# Patient Record
Sex: FEMALE | URBAN - METROPOLITAN AREA
[De-identification: names, ages, dates, MRNs, and addresses within clinical notes are randomized per-mention and may not be internally consistent; named-entity substitution may affect disease eponyms.]

---

## 2020-07-25 ENCOUNTER — TELEPHONE ENCOUNTER (OUTPATIENT)
Dept: URBAN - METROPOLITAN AREA CLINIC 13 | Facility: CLINIC | Age: 50
End: 2020-07-25

## 2020-07-26 ENCOUNTER — TELEPHONE ENCOUNTER (OUTPATIENT)
Dept: URBAN - METROPOLITAN AREA CLINIC 13 | Facility: CLINIC | Age: 50
End: 2020-07-26

## 2023-07-11 ENCOUNTER — LAB OUTSIDE AN ENCOUNTER (OUTPATIENT)
Dept: URBAN - METROPOLITAN AREA CLINIC 113 | Facility: CLINIC | Age: 53
End: 2023-07-11

## 2023-07-11 ENCOUNTER — OFFICE VISIT (OUTPATIENT)
Dept: URBAN - METROPOLITAN AREA CLINIC 113 | Facility: CLINIC | Age: 53
End: 2023-07-11
Payer: COMMERCIAL

## 2023-07-11 VITALS
HEIGHT: 61 IN | RESPIRATION RATE: 18 BRPM | SYSTOLIC BLOOD PRESSURE: 145 MMHG | HEART RATE: 88 BPM | DIASTOLIC BLOOD PRESSURE: 97 MMHG | BODY MASS INDEX: 47.73 KG/M2 | TEMPERATURE: 97.5 F | WEIGHT: 252.8 LBS

## 2023-07-11 DIAGNOSIS — Z12.11 COLON CANCER SCREENING: ICD-10-CM

## 2023-07-11 PROBLEM — 408512008: Status: ACTIVE | Noted: 2023-07-11

## 2023-07-11 PROBLEM — 83911000119104: Status: ACTIVE | Noted: 2023-07-11

## 2023-07-11 PROCEDURE — 993 PRE-COLON SCREENING (NON-BILLABLE): Performed by: INTERNAL MEDICINE

## 2023-07-11 RX ORDER — VALSARTAN 160 MG/1
1 TABLET TABLET, FILM COATED ORAL ONCE A DAY
Status: ACTIVE | COMMUNITY

## 2023-07-11 RX ORDER — TRIAMTERENE AND HYDROCHLOROTHIAZIDE 37.5; 25 MG/1; MG/1
1 TABLET IN THE MORNING TABLET ORAL ONCE A DAY
Status: ACTIVE | COMMUNITY

## 2023-07-11 NOTE — HPI-TODAY'S VISIT:
53 yo woman referred by Dr. Aung Torrez for colon cancer screening. She has a past medical history of arthritis, hypertension, obesity, left bundle branch block, arthritis, giant cell tumor. A copy of today's visit will be forwarded to the referring provider. She was deemed ineligible for sedated procedures due to elevated BMI.  She has never had a colonoscopy. She reports two first degree relatives with a history of colon polyps in her father and sister. There is no family history of colon cancer.  No dysphagia. She has heartburn, for which she uses Tums as needed. She has had heartburn for years, worse over the last 6 months, occurring about 3 to 4 times per week. No abdominal pain. No nausea or vomiting. Her weight is stable. She is trying to lose weight but finds this difficult. There is no red blood per rectum or melena. She has bowel movements twice daily.  In 2009, she was diagnosed with a left bundle branch block, identified on an EKG performed pre-operatively before knee surgery. She was cleared for surgery in 2009 by Dr. Biswas. She has not seen cardiology since this time.  Her last EKG was performed in April 2021 for evaluation of complaints of heart fluttering. She was noted to have a left bundle branch block with PVCs.

## 2023-07-17 ENCOUNTER — CLAIMS CREATED FROM THE CLAIM WINDOW (OUTPATIENT)
Dept: URBAN - METROPOLITAN AREA SURGERY CENTER 25 | Facility: SURGERY CENTER | Age: 53
End: 2023-07-17

## 2023-07-17 ENCOUNTER — OUT OF OFFICE VISIT (OUTPATIENT)
Dept: URBAN - METROPOLITAN AREA SURGERY CENTER 25 | Facility: SURGERY CENTER | Age: 53
End: 2023-07-17
Payer: COMMERCIAL

## 2023-07-17 DIAGNOSIS — Z83.71 FAMILY HISTORY OF COLON POLYPS: ICD-10-CM

## 2023-07-17 DIAGNOSIS — Z12.11 COLON CANCER SCREENING (HIGH RISK): ICD-10-CM

## 2023-07-17 DIAGNOSIS — Z12.11 COLON CANCER SCREENING: ICD-10-CM

## 2023-07-17 PROCEDURE — G8907 PT DOC NO EVENTS ON DISCHARG: HCPCS | Performed by: INTERNAL MEDICINE

## 2023-07-17 PROCEDURE — 45378 DIAGNOSTIC COLONOSCOPY: CPT | Performed by: INTERNAL MEDICINE

## 2023-07-17 PROCEDURE — 00812 ANES LWR INTST SCR COLSC: CPT | Performed by: ANESTHESIOLOGY

## 2023-07-17 PROCEDURE — 00812 ANES LWR INTST SCR COLSC: CPT | Performed by: NURSE ANESTHETIST, CERTIFIED REGISTERED

## 2023-07-17 RX ORDER — TRIAMTERENE AND HYDROCHLOROTHIAZIDE 37.5; 25 MG/1; MG/1
1 TABLET IN THE MORNING TABLET ORAL ONCE A DAY
Status: ACTIVE | COMMUNITY

## 2023-07-17 RX ORDER — VALSARTAN 160 MG/1
1 TABLET TABLET, FILM COATED ORAL ONCE A DAY
Status: ACTIVE | COMMUNITY

## 2023-10-10 ENCOUNTER — OFFICE VISIT (OUTPATIENT)
Dept: URBAN - METROPOLITAN AREA CLINIC 113 | Facility: CLINIC | Age: 53
End: 2023-10-10

## 2023-10-17 PROBLEM — 429969003: Status: ACTIVE | Noted: 2023-10-17

## 2023-10-18 ENCOUNTER — OFFICE VISIT (OUTPATIENT)
Dept: URBAN - METROPOLITAN AREA CLINIC 113 | Facility: CLINIC | Age: 53
End: 2023-10-18
Payer: COMMERCIAL

## 2023-10-18 VITALS
TEMPERATURE: 98 F | BODY MASS INDEX: 47.77 KG/M2 | DIASTOLIC BLOOD PRESSURE: 117 MMHG | HEART RATE: 87 BPM | SYSTOLIC BLOOD PRESSURE: 170 MMHG | HEIGHT: 61 IN | WEIGHT: 253 LBS

## 2023-10-18 DIAGNOSIS — Z12.11 COLON CANCER SCREENING: ICD-10-CM

## 2023-10-18 DIAGNOSIS — E66.01 MORBID (SEVERE) OBESITY DUE TO EXCESS CALORIES: ICD-10-CM

## 2023-10-18 DIAGNOSIS — R12 HEARTBURN: ICD-10-CM

## 2023-10-18 DIAGNOSIS — Z83.719 FAMILY HISTORY OF COLONIC POLYPS: ICD-10-CM

## 2023-10-18 PROBLEM — 16331000: Status: ACTIVE | Noted: 2023-10-18

## 2023-10-18 PROCEDURE — 99212 OFFICE O/P EST SF 10 MIN: CPT | Performed by: INTERNAL MEDICINE

## 2023-10-18 RX ORDER — VALSARTAN 160 MG/1
1 TABLET TABLET, FILM COATED ORAL ONCE A DAY
Status: ACTIVE | COMMUNITY

## 2023-10-18 RX ORDER — TRIAMTERENE AND HYDROCHLOROTHIAZIDE 37.5; 25 MG/1; MG/1
1 TABLET IN THE MORNING TABLET ORAL ONCE A DAY
Status: ACTIVE | COMMUNITY

## 2023-10-18 NOTE — HPI-TODAY'S VISIT:
54 yo woman referred by Dr. Aung Torrez for colon cancer screening. She has a past medical history of arthritis, hypertension, obesity, left bundle branch block, arthritis, giant cell tumor.  She presents for follow-up after colonoscopy examination.  She reports two first degree relatives with a history of colon polyps in her father and sister. There is no family history of colon cancer.  She is doing well and has a bowel movement every day.  There is been no blood or melena, nausea or vomiting.  She does occasionally use Mobic for arthritis.  She has heartburn at least a few times per week.  There is no dysphagia or abdominal pain.  Her maternal grandmother may have had some sort of cancer in the abdomen possibly stomach cancer or may be even colon cancer but she is not sure. In 2009, she was diagnosed with a left bundle branch block, identified on an EKG performed pre-operatively before knee surgery. She was cleared for surgery in 2009 by Dr. Biswas. She has not seen cardiology since this time.  Her last EKG was performed in April 2021 for evaluation of complaints of heart fluttering. She was noted to have a left bundle branch block with PVCs.

## 2024-04-19 ENCOUNTER — OV EP (OUTPATIENT)
Dept: URBAN - METROPOLITAN AREA CLINIC 113 | Facility: CLINIC | Age: 54
End: 2024-04-19
Payer: COMMERCIAL

## 2024-04-19 VITALS
HEART RATE: 92 BPM | DIASTOLIC BLOOD PRESSURE: 91 MMHG | BODY MASS INDEX: 49.65 KG/M2 | HEIGHT: 61 IN | RESPIRATION RATE: 18 BRPM | TEMPERATURE: 98.1 F | SYSTOLIC BLOOD PRESSURE: 155 MMHG | WEIGHT: 263 LBS

## 2024-04-19 DIAGNOSIS — R12 HEARTBURN: ICD-10-CM

## 2024-04-19 DIAGNOSIS — E66.01 MORBID (SEVERE) OBESITY DUE TO EXCESS CALORIES: ICD-10-CM

## 2024-04-19 PROCEDURE — 99214 OFFICE O/P EST MOD 30 MIN: CPT | Performed by: NURSE PRACTITIONER

## 2024-04-19 RX ORDER — VALSARTAN 160 MG/1
1 TABLET TABLET, FILM COATED ORAL ONCE A DAY
Status: ON HOLD | COMMUNITY

## 2024-04-19 RX ORDER — PANTOPRAZOLE SODIUM 40 MG/1
1 TABLET TABLET, DELAYED RELEASE ORAL ONCE A DAY
Qty: 90 TABLET | Refills: 2 | OUTPATIENT
Start: 2024-04-19

## 2024-04-19 RX ORDER — MELOXICAM 15 MG/1
1 TABLET TABLET ORAL ONCE A DAY
Status: ACTIVE | COMMUNITY

## 2024-04-19 RX ORDER — VALSARTAN 160 MG/1
1 TABLET TABLET ORAL ONCE A DAY
Status: ACTIVE | COMMUNITY

## 2024-04-19 RX ORDER — TRIAMTERENE AND HYDROCHLOROTHIAZIDE 37.5; 25 MG/1; MG/1
1 TABLET IN THE MORNING TABLET ORAL ONCE A DAY
Status: ACTIVE | COMMUNITY

## 2024-04-19 NOTE — HPI-TODAY'S VISIT:
54 yo woman with a history of arthritis, hypertension, obesity, left bundle branch block, arthritis, giant cell tumor, presenting for 6 months follow up for GERD.  She tells me that she is unable to avoid Mobic due to bilateral knee bone-on-bone arthritis. She does use Tylenol arthritis as needed. She has heartburn daily, usually occurring in the evenings. During the course of the day, she typically is okay. However, evenings, especially after dinner, she has more indigestion. She does not have any trouble with swallowing solid foods or liquids. She does have trouble swallowing pills larger than an aspirin. There is no weight loss unintentionally. She is not currently on any medication other than Tums for reflux. THere is no melena. No red blood per rectum. She has bowel movements twice daily.

## 2024-06-18 ENCOUNTER — OFFICE VISIT (OUTPATIENT)
Dept: URBAN - METROPOLITAN AREA CLINIC 113 | Facility: CLINIC | Age: 54
End: 2024-06-18

## 2024-07-18 ENCOUNTER — OFFICE VISIT (OUTPATIENT)
Dept: URBAN - METROPOLITAN AREA CLINIC 113 | Facility: CLINIC | Age: 54
End: 2024-07-18
Payer: COMMERCIAL

## 2024-07-18 ENCOUNTER — DASHBOARD ENCOUNTERS (OUTPATIENT)
Age: 54
End: 2024-07-18

## 2024-07-18 VITALS
HEART RATE: 90 BPM | HEIGHT: 61 IN | WEIGHT: 261.8 LBS | SYSTOLIC BLOOD PRESSURE: 156 MMHG | TEMPERATURE: 97.1 F | DIASTOLIC BLOOD PRESSURE: 102 MMHG | BODY MASS INDEX: 49.43 KG/M2 | RESPIRATION RATE: 20 BRPM

## 2024-07-18 DIAGNOSIS — R12 HEARTBURN: ICD-10-CM

## 2024-07-18 DIAGNOSIS — E66.01 MORBID (SEVERE) OBESITY DUE TO EXCESS CALORIES: ICD-10-CM

## 2024-07-18 PROCEDURE — 99213 OFFICE O/P EST LOW 20 MIN: CPT | Performed by: NURSE PRACTITIONER

## 2024-07-18 RX ORDER — PANTOPRAZOLE SODIUM 40 MG/1
1 TABLET TABLET, DELAYED RELEASE ORAL ONCE A DAY
Qty: 90 TABLET | Refills: 2 | OUTPATIENT

## 2024-07-18 RX ORDER — MELOXICAM 15 MG/1
1 TABLET TABLET ORAL ONCE A DAY
Status: ACTIVE | COMMUNITY

## 2024-07-18 RX ORDER — VALSARTAN 160 MG/1
1 TABLET TABLET ORAL ONCE A DAY
Status: ACTIVE | COMMUNITY

## 2024-07-18 RX ORDER — TRIAMTERENE AND HYDROCHLOROTHIAZIDE 37.5; 25 MG/1; MG/1
1 TABLET IN THE MORNING TABLET ORAL ONCE A DAY
Status: ACTIVE | COMMUNITY

## 2024-07-18 RX ORDER — VALSARTAN 160 MG/1
1 TABLET TABLET, FILM COATED ORAL ONCE A DAY
Status: ON HOLD | COMMUNITY

## 2024-07-18 RX ORDER — PANTOPRAZOLE SODIUM 40 MG/1
1 TABLET TABLET, DELAYED RELEASE ORAL ONCE A DAY
Qty: 90 TABLET | Refills: 2 | Status: ACTIVE | COMMUNITY
Start: 2024-04-19

## 2024-07-18 NOTE — HPI-TODAY'S VISIT:
52 yo woman with a history of arthritis, hypertension, obesity, left bundle branch block, arthritis, giant cell tumor, presenting for 6 months follow up for GERD.  4/19/24: She tells me that she is unable to avoid Mobic due to bilateral knee bone-on-bone arthritis. She does use Tylenol arthritis as needed. She has heartburn daily, usually occurring in the evenings. During the course of the day, she typically is okay. However, evenings, especially after dinner, she has more indigestion. She does not have any trouble with swallowing solid foods or liquids. She does have trouble swallowing pills larger than an aspirin. There is no weight loss unintentionally. She is not currently on any medication other than Tums for reflux. THere is no melena. No red blood per rectum. She has bowel movements twice daily.  7/18/24: She tells me that her reflux is not bad. She has only been taking pantoprazole for a couple of weeks. Unfortunately, this was because of a discrepancy with her birthdate at the pharmacy. She is now taking pantoprazole each day. She has chronic trouble with swallowing pills, characterized as phobia. There is no abdominal pain. She had some transient nausea attributed to being out in the heat. No vomiting. No melena or red blood per rectum. She has bowel movements twice daily to three times daily if she had had some dietary fiber (i.e. oatmeal).

## 2025-01-02 ENCOUNTER — OFFICE VISIT (OUTPATIENT)
Dept: URBAN - METROPOLITAN AREA CLINIC 113 | Facility: CLINIC | Age: 55
End: 2025-01-02
Payer: COMMERCIAL

## 2025-01-02 VITALS
HEIGHT: 61 IN | SYSTOLIC BLOOD PRESSURE: 169 MMHG | BODY MASS INDEX: 48.86 KG/M2 | RESPIRATION RATE: 20 BRPM | HEART RATE: 82 BPM | WEIGHT: 258.8 LBS | DIASTOLIC BLOOD PRESSURE: 104 MMHG | TEMPERATURE: 97.5 F

## 2025-01-02 DIAGNOSIS — R12 HEARTBURN: ICD-10-CM

## 2025-01-02 DIAGNOSIS — E66.01 MORBID (SEVERE) OBESITY DUE TO EXCESS CALORIES: ICD-10-CM

## 2025-01-02 PROCEDURE — 99214 OFFICE O/P EST MOD 30 MIN: CPT | Performed by: NURSE PRACTITIONER

## 2025-01-02 RX ORDER — VALSARTAN 160 MG/1
1 TABLET TABLET ORAL ONCE A DAY
Status: ACTIVE | COMMUNITY

## 2025-01-02 RX ORDER — MELOXICAM 15 MG/1
1 TABLET TABLET ORAL ONCE A DAY
Status: ACTIVE | COMMUNITY

## 2025-01-02 RX ORDER — PANTOPRAZOLE SODIUM 40 MG/1
1 TABLET TABLET, DELAYED RELEASE ORAL ONCE A DAY
Qty: 90 TABLET | Refills: 2 | OUTPATIENT

## 2025-01-02 RX ORDER — VALSARTAN 160 MG/1
1 TABLET TABLET, FILM COATED ORAL ONCE A DAY
Status: ON HOLD | COMMUNITY

## 2025-01-02 RX ORDER — TRIAMTERENE AND HYDROCHLOROTHIAZIDE 37.5; 25 MG/1; MG/1
1 TABLET IN THE MORNING TABLET ORAL ONCE A DAY
Status: ACTIVE | COMMUNITY

## 2025-01-02 RX ORDER — PANTOPRAZOLE SODIUM 40 MG/1
1 TABLET TABLET, DELAYED RELEASE ORAL ONCE A DAY
Qty: 90 TABLET | Refills: 2 | Status: ACTIVE | COMMUNITY